# Patient Record
Sex: MALE | Race: WHITE | Employment: OTHER | URBAN - METROPOLITAN AREA
[De-identification: names, ages, dates, MRNs, and addresses within clinical notes are randomized per-mention and may not be internally consistent; named-entity substitution may affect disease eponyms.]

---

## 2020-05-21 ENCOUNTER — HISTORICAL (OUTPATIENT)
Dept: CARDIOLOGY | Facility: HOSPITAL | Age: 70
End: 2020-05-21

## 2020-06-04 ENCOUNTER — HISTORICAL (OUTPATIENT)
Dept: CARDIOLOGY | Facility: HOSPITAL | Age: 70
End: 2020-06-04

## 2022-02-17 LAB — HBA1C MFR BLD: 6.5 % (ref 4–6)

## 2022-04-30 NOTE — OP NOTE
DATE OF SURGERY:    05/21/2020    SURGEON:  Adan Contreras MD    PROCEDURES:    1. Bilateral lower extremity angiography.  2. Successful crossing of the 100% occluded right mid SFA, popliteal artery, TP trunk into the posterior tibial artery.  3. Laser atherectomy, percutaneous transluminal angioplasty and stenting of the right SFA and the popliteal artery.    ACCESS:  Left common femoral artery.    INDICATION:  Rest claudication, Hilger class 5 symptoms.    SEDATION TIME:  40 minutes.    PROCEDURE IN DETAIL:  The patient was brought to the cardiac catheterization laboratory.  After obtaining informed consent, the left groin was prepped and draped in the usual sterile manner, copious amount of 2% xylocaine was instilled to the left groin.  The left common femoral artery was accessed using a standard Seldinger technique.  A 5-Mauritanian sheath introduced.  Next, using a combination of 5-Mauritanian Omni Flush catheter and 0.035-inch stiff angled Glidewire, the catheter was advanced into the distal abdominal aorta and subsequently the catheter was diverted over the wire into the right common femoral artery.  Angiogram of the right lower extremity was performed which revealed patent right common femoral artery and profunda femoris artery.  The mid segment of the SFA was 100% occluded.  The popliteal artery was 100% occluded.  The TP trunk was 100% occluded and posterior tibial artery was faintly recanalized by the collaterals in the mid segment.     At this point of time, after the administration of adequate anticoagulation, the short 5-Mauritanian sheath in the left common femoral artery was exchanged for a long 6 x 45 cm sheath in a crossover fashion.  Next, using a combination of support catheter, namely an 0.035 Quick-Cross catheter and 0.35 stiff angled Glidewire, I was able to successfully traverse through the mid SFA into the popliteal artery.  Then, I changed the wire to 0.018 inch wire and I traversed the  TP trunk into the posterior tibial artery.  Confirmation angiogram was performed.  Next, the wire was exchanged for a 0.014-inch Mongo wire, and laser atherectomy of the SFA, popliteal and TP trunk was performed using a Cityvox 2 mm laser catheter followed by balloon angioplasty.  We then used a 6 x 200 length balloon for the SFA and the popliteal artery of the right lower extremity and a 3 x 2 mm length balloon with the TP trunk in the posterior tibial artery.  Angiogram was performed which revealed some flow-limiting dissections with the residual of more than 50% complex stenoses.  At this point in time, 2 self-expanding stents, namely 6 x 150 and a 6 x 100 mm stents, were deployed in the popliteal artery and the superficial artery of the right lower extremity.  These stents were post dilated using a 6 x 200 mm length balloon.     Final angiogram revealed a good angiographic result with less than 10% residual stenosis with a 1 vessel runoff to the foot, namely the posterior tibial artery.   Next, angiogram of the left lower extremity was performed which revealed moderate in-stent restenosis of the left superficial femoral artery and severe infrapopliteal vessel disease.     In view of the critical limb ischemia, I will bring the patient back in the next 2-3 weeks time to intervene in the proximal left lower extremity.  The patient tolerated the procedure well.  No complications were noted.        ______________________________  Adan Contreras MD    RRP/UL  DD:  05/24/2020  Time:  04:10PM  DT:  05/24/2020  Time:  04:56PM  Job #:  [id number]

## 2022-07-13 PROCEDURE — G0179 PR HOME HEALTH MD RECERTIFICATION: ICD-10-PCS | Mod: ,,, | Performed by: FAMILY MEDICINE

## 2022-07-13 PROCEDURE — G0179 MD RECERTIFICATION HHA PT: HCPCS | Mod: ,,, | Performed by: FAMILY MEDICINE

## 2022-08-01 ENCOUNTER — DOCUMENT SCAN (OUTPATIENT)
Dept: HOME HEALTH SERVICES | Facility: HOSPITAL | Age: 72
End: 2022-08-01

## 2022-08-18 ENCOUNTER — DOCUMENT SCAN (OUTPATIENT)
Dept: HOME HEALTH SERVICES | Facility: HOSPITAL | Age: 72
End: 2022-08-18

## 2022-08-25 ENCOUNTER — DOCUMENT SCAN (OUTPATIENT)
Dept: HOME HEALTH SERVICES | Facility: HOSPITAL | Age: 72
End: 2022-08-25

## 2022-08-29 ENCOUNTER — EXTERNAL HOME HEALTH (OUTPATIENT)
Dept: HOME HEALTH SERVICES | Facility: HOSPITAL | Age: 72
End: 2022-08-29

## 2022-09-07 ENCOUNTER — DOCUMENT SCAN (OUTPATIENT)
Dept: HOME HEALTH SERVICES | Facility: HOSPITAL | Age: 72
End: 2022-09-07

## 2022-10-12 ENCOUNTER — DOCUMENT SCAN (OUTPATIENT)
Dept: HOME HEALTH SERVICES | Facility: HOSPITAL | Age: 72
End: 2022-10-12
Payer: MEDICARE

## 2023-04-28 ENCOUNTER — OFFICE VISIT (OUTPATIENT)
Dept: FAMILY MEDICINE | Facility: CLINIC | Age: 73
End: 2023-04-28
Payer: MEDICARE

## 2023-04-28 VITALS
RESPIRATION RATE: 18 BRPM | BODY MASS INDEX: 27.89 KG/M2 | WEIGHT: 184 LBS | HEART RATE: 85 BPM | DIASTOLIC BLOOD PRESSURE: 67 MMHG | OXYGEN SATURATION: 97 % | HEIGHT: 68 IN | TEMPERATURE: 98 F | SYSTOLIC BLOOD PRESSURE: 134 MMHG

## 2023-04-28 DIAGNOSIS — E10.65 TYPE 1 DIABETES MELLITUS WITH HYPERGLYCEMIA: Primary | ICD-10-CM

## 2023-04-28 DIAGNOSIS — G60.0 CHARCOT MARIE TOOTH MUSCULAR ATROPHY: ICD-10-CM

## 2023-04-28 DIAGNOSIS — I25.10 CORONARY ARTERY DISEASE INVOLVING NATIVE CORONARY ARTERY, UNSPECIFIED WHETHER ANGINA PRESENT, UNSPECIFIED WHETHER NATIVE OR TRANSPLANTED HEART: ICD-10-CM

## 2023-04-28 DIAGNOSIS — E78.5 HYPERLIPIDEMIA, UNSPECIFIED HYPERLIPIDEMIA TYPE: ICD-10-CM

## 2023-04-28 DIAGNOSIS — G71.00 MUSCULAR DYSTROPHY: ICD-10-CM

## 2023-04-28 DIAGNOSIS — I77.9 PERIPHERAL ARTERIAL OCCLUSIVE DISEASE: ICD-10-CM

## 2023-04-28 PROCEDURE — 99204 OFFICE O/P NEW MOD 45 MIN: CPT | Mod: ,,, | Performed by: NURSE PRACTITIONER

## 2023-04-28 PROCEDURE — 99204 PR OFFICE/OUTPT VISIT, NEW, LEVL IV, 45-59 MIN: ICD-10-PCS | Mod: ,,, | Performed by: NURSE PRACTITIONER

## 2023-04-28 RX ORDER — CLOPIDOGREL BISULFATE 75 MG/1
75 TABLET ORAL DAILY
COMMUNITY
Start: 2023-03-20 | End: 2023-05-26 | Stop reason: SDUPTHER

## 2023-04-28 RX ORDER — CILOSTAZOL 50 MG/1
50 TABLET ORAL 2 TIMES DAILY
COMMUNITY
Start: 2023-04-11 | End: 2023-05-26 | Stop reason: SDUPTHER

## 2023-04-28 RX ORDER — ROSUVASTATIN CALCIUM 20 MG/1
20 TABLET, COATED ORAL NIGHTLY
COMMUNITY
Start: 2023-04-03 | End: 2023-05-26 | Stop reason: SDUPTHER

## 2023-04-28 RX ORDER — INSULIN GLARGINE 300 U/ML
18 INJECTION, SOLUTION SUBCUTANEOUS NIGHTLY
COMMUNITY
Start: 2022-12-28

## 2023-04-28 RX ORDER — INSULIN ASPART 100 [IU]/ML
INJECTION, SOLUTION INTRAVENOUS; SUBCUTANEOUS
COMMUNITY
Start: 2023-03-23

## 2023-04-28 NOTE — PROGRESS NOTES
Subjective:      Patient ID: Luciano Mariee is a 72 y.o. White male      Chief Complaint: Establish Care       Past Medical History:   Diagnosis Date    CAD (coronary artery disease)     Charcot Jagruti Tooth muscular atrophy     History of foot ulcer     History of MI (myocardial infarction) 2012    Hyperlipidemia     PAD (peripheral artery disease)     Type 1 diabetes     Unspecified cataract         HPI  Presents to clinic for establishment.  Has upcoming appt with Dr. Moore.    Type 1 DM:  Diagnosed at age 7.  States most recent A1C is 5.7 (2023)  Currently taking Toujeo 18 units hs and Novolog 16 units before breakfast , 10 units at noon; and 10 units dinner.  Old records requested; recent labs with Pallavi Endocrinologist.    DM eye exam due; states he set up appt; will let us know appt date next visit    Ade (DM foot exam) x 3 weeks ago; results requested    Cardiologist is Dr. Cholo REECE (PTA/stent); CABG/Valve Replacement; Hx MI  Currently on Plavix, Pletal  Old records requested    HLD on Statin.  Tolerating well.      Hx Charcot-Jagruti-Tooth muscular dystrophy.  Not currently seeing any specialist.  Has HH with Julianna once weekly        Review of Systems   Constitutional:  Negative for chills, fatigue, fever and unexpected weight change.   HENT: Negative.     Eyes: Negative.    Respiratory: Negative.  Negative for shortness of breath.    Cardiovascular: Negative.  Negative for chest pain.   Gastrointestinal: Negative.    Endocrine: Negative.    Genitourinary: Negative.    Musculoskeletal: Negative.    Integumentary:  Negative.   Allergic/Immunologic: Negative.    Neurological: Negative.  Negative for weakness.   Hematological: Negative.    Psychiatric/Behavioral: Negative.     All other systems reviewed and are negative.       Objective:      Vitals:    04/28/23 0900   BP: 134/67   Pulse: 85   Resp: 18   Temp: 97.6 °F (36.4 °C)      Body mass index is 27.98 kg/m².     Physical Exam  Vitals reviewed.    Constitutional:       Appearance: He is not toxic-appearing.   HENT:      Head: Normocephalic.      Mouth/Throat:      Mouth: Mucous membranes are moist.   Eyes:      Extraocular Movements: Extraocular movements intact.      Pupils: Pupils are equal, round, and reactive to light.   Cardiovascular:      Rate and Rhythm: Normal rate and regular rhythm.      Pulses: Normal pulses.      Heart sounds: Normal heart sounds.   Pulmonary:      Effort: Pulmonary effort is normal. No respiratory distress.      Breath sounds: Normal breath sounds.   Abdominal:      General: Bowel sounds are normal. There is no distension.      Palpations: Abdomen is soft.      Tenderness: There is no abdominal tenderness.   Musculoskeletal:         General: No tenderness. Normal range of motion.      Cervical back: Neck supple.   Skin:     General: Skin is warm and dry.   Neurological:      Mental Status: He is alert and oriented to person, place, and time.   Psychiatric:         Mood and Affect: Mood normal.          Current Outpatient Medications:     cilostazoL (PLETAL) 50 MG Tab, Take 50 mg by mouth 2 (two) times daily. 30 minutes before or 2 hours after breakfast and dinner, Disp: , Rfl:     clopidogreL (PLAVIX) 75 mg tablet, Take 75 mg by mouth once daily., Disp: , Rfl:     NOVOLOG FLEXPEN U-100 INSULIN 100 unit/mL (3 mL) InPn pen, Inject into the skin., Disp: , Rfl:     rosuvastatin (CRESTOR) 20 MG tablet, Take 20 mg by mouth every evening., Disp: , Rfl:     TOUJEO SOLOSTAR U-300 INSULIN 300 unit/mL (1.5 mL) InPn pen, Inject into the skin., Disp: , Rfl:     Assessment & Plan:     Problem List Items Addressed This Visit          Neuro    Charcot Jagruti Tooth muscular atrophy     Stable  Not currently seeing any specialist              Cardiac/Vascular    Peripheral arterial occlusive disease     Stable  Continue Statin  Keep appt with Cardiologist for follow up             CAD (coronary artery disease)     Stable  Continue current  meds  Keep appt with Cardiologist for follow up             Hyperlipidemia     Stable  Continue Statin as prescribed  Keep appt with PCP for follow up  Verbalizes understanding              Endocrine    Type 1 diabetes mellitus with hyperglycemia - Primary      Diagnosed at age 7.  States most recent A1C is 5.7 (2023)  Currently taking Toujeo 18 units hs and Novolog 16 units before breakfast, 10 units at noon; and 10 units dinner  Med management:  Continue insulin regimen as prescribed; verbalizes understanding  Daily CBG's  Educated on hypoglycemia and interventions  Old records requested; recent labs with Sary Endocrinologist.    DM eye exam due; states he set up appt; will let us know appt date next visit   Dr. Booker (DM foot exam) x 3 weeks ago; results requested             Relevant Medications    NOVOLOG FLEXPEN U-100 INSULIN 100 unit/mL (3 mL) InPn pen    TOUJEO SOLOSTAR U-300 INSULIN 300 unit/mL (1.5 mL) InPn pen       Orthopedic    Muscular dystrophy

## 2023-05-01 PROBLEM — G60.0 CHARCOT MARIE TOOTH MUSCULAR ATROPHY: Status: ACTIVE | Noted: 2023-05-01

## 2023-05-01 NOTE — ASSESSMENT & PLAN NOTE
Diagnosed at age 7.  States most recent A1C is 5.7 (2023)  Currently taking Toujeo 18 units hs and Novolog 16 units before breakfast, 10 units at noon; and 10 units dinner  Med management:  Continue insulin regimen as prescribed; verbalizes understanding  Daily CBG's  Educated on hypoglycemia and interventions  Old records requested; recent labs with Sary, Endocrinologist.    DM eye exam due; states he set up appt; will let us know appt date next visit   Dr. Booker (DM foot exam) x 3 weeks ago; results requested     Patient Education   Patient Education   Patient Education     Having EMG and NCS Tests  You will be having electromyography (EMG) and nerve conduction studies (NCS) to measure your muscle and nerve function. In most cases, both tests are done. NCS is most often done first. You will be asked to lie on an exam table with a blanket over you. You may have 1 or both of the following:    Nerve conduction study (NCS)  During NCS, mild electrical currents are used to test how fast electrical signals move along your nerves. The healthcare provider will put small metal disks (electrodes) on your skin on the area of your body being tested. This will be done using water-based gel or paste. A doctor or technologist will apply mild electrical currents to your skin. Your muscles will twitch, but the test won’t harm you. Currents are usually applied to the same area several times. Usually the intensity of the electrical stimulation is increased on each body part. There may be some increasing mild pain that varies from person to person. But the electrical shock is not dangerous. The test may continue on other parts of your body unless the reason for doing the test is limited to a small part of the body.  Electromyography (EMG)  Most of the electrodes will be removed for EMG. The healthcare provider will clean the area being tested with alcohol. A very thin sterile needle will be put into the muscles in this area. When the needle is inserted, you may feel as if your skin is being pinched. Try to relax and do as instructed. You will be asked to relax and contract the muscle being tested. Following instructions will allow your provider to interpret the test results.  Let the technologist know  For your safety and for the success of your test, tell the technologist if you:  · Have any bleeding problems  · Take blood thinners (anticoagulants) or other medicines, including aspirin  · Have any immune system problems  · Have had neck or  back surgery  You may also be asked questions about your overall health.  Before the test  Prepare for your test as instructed. Shower or bathe, but don't use powder, oil, or lotion. Your skin should be clean and free of excess oil. Wear loose clothes. But know that you may be asked to change into a hospital gown. The entire test will take about 60 minutes. Allow extra time to check in.  After your test  Before you leave, all electrodes will be removed. You can then get right back to your normal routine. If you feel tired or have some mild pain, take it easy. If you were told to stop taking any medicines for your test, ask when you can start taking them again. Your healthcare provider will let you know when your test results are ready.  Date Last Reviewed: 12/1/2017 © 2000-2018 "[employer]. [address] [place] [address]. All rights reserved. This information is not intended as a substitute for professional medical care. Always follow your healthcare professional's instructions.           Carpal Tunnel Release Surgery  Surgery may be done if your carpal tunnel syndrome (CTS) symptoms become severe. Or you may have surgery if no other treatment eases your symptoms. There are 2 types of CTS surgical procedures. You will be told about the one you will have. You’ll also be instructed on how to prepare for it.      The goals of surgery  Two types of surgery are used to treat CTS: open and endoscopic.  · Open surgery. With open surgery, your surgeon makes 1 cut (incision) in your palm. Standard surgical tools are used.  · Endoscopic surgery. For this surgery, 1 or 2 small incisions are made in your hand or wrist. A thin tube with a very small camera attached (endoscope) is inserted under the carpal ligament. Tiny tools are inserted there as well. The surgeon then operates while watching images on a video screen. Each procedure has the same goal: Your surgeon will relieve pressure on the median  nerve. To do this, the transverse carpal ligament is cut (released).  After surgery  If you’ve had carpal tunnel surgery, you will spend a few hours resting before you go home. The nerve sensation and blood circulation in your hand will be checked at this time. For the safest healing, do the following:  · Keep your hand raised above heart level. This will help reduce swelling.  · Limit hand and wrist use as instructed. You may need a wrist brace.  · Take any pain medicine as directed.  · Do hand exercises as directed by your surgeon or therapist.  When to call the surgeon  Call your surgeon if you notice any of the following:  · White or pale-blue hand or nails (you pinch your skin or nail and the color doesn’t return)  · Pain that is not relieved by prescribed medicine  · Loss of sensation or excess swelling in hand or fingers  · Pus-like liquid oozing out of your wound  · Fever over 100.4°F (38°C)   Date Last Reviewed: 1/1/2018 © 2000-2018 [employer]. [address] [place] [address]. All rights reserved. This information is not intended as a substitute for professional medical care. Always follow your healthcare professional's instructions.           Understanding Carpal Tunnel Syndrome    The carpal tunnel is a narrow space inside the wrist. It is ringed by bone and a band of tough tissue called the transverse carpal ligament. A major nerve called the median nerve runs from the forearm into the hand through the carpal tunnel. Tendons also run through the carpal tunnel.  With carpal tunnel syndrome, the tendons or nearby tissues within the carpal tunnel may swell or thicken. Or the transverse carpal ligament may harden and shorten. This narrows the space in the carpal tunnel and puts pressure on the median nerve. This pressure leads to tingling and numbness of the hand and wrist. In time, the condition can make even simple tasks hard to do.  What causes carpal tunnel syndrome?  Doctors  aren’t entirely clear why the condition occurs. Certain things may make a person more likely to have it. These include:  · Being female  · Being pregnant  · Being overweight  · Having diabetes or rheumatoid arthritis  Symptoms of carpal tunnel syndrome  Symptoms often come and go. At first, symptoms may occur mainly at night. Later, they may be noticed during the day as well. They may get worse with activities such as driving, reading, typing, or holding a phone. Symptoms can include:  · Tingling and numbness in the hand or wrist  · Sharp pain that shoots up the arm or down to the fingers  · Hand stiffness or cramping, especially in the morning  · Trouble making a fist  · Hand weakness and clumsiness  Treatment for carpal tunnel syndrome  Certain treatments help reduce the pressure on the median nerve and relieve symptoms. Choices for treatment may include one or more of the following:  · Wrist splint. This involves wearing a special brace on the wrist and hand. The splint holds the wrist straight, in a neutral position. This helps keep the carpal tunnel as open as possible.  · Cortisone shots. Cortisone is a medicine that helps reduce swelling. It is injected directly into the wrist. It helps shrink tissues inside the carpal tunnel. This relieves symptoms for a time.  · Pain medicines. You may take over-the-counter or prescription medicines to help reduce swelling and relieve symptoms.  · Surgery. If the condition doesn’t respond to other treatments and doesn’t go away on its own, you may need surgery. During surgery, the surgeon cuts the transverse carpal ligament to relieve pressure on the median nerve.     When to call your healthcare provider  Call your healthcare provider right away if you have any of these:  · Fever of 100.4°F (38°C) or higher, or as directed  · Symptoms that don’t get better, or get worse  · New symptoms   Date Last Reviewed: 3/10/2016  © 5401-8124 [employer]. [address] [place] [address]  [place] [address]. All rights reserved. This information is not intended as a substitute for professional medical care. Always follow your healthcare professional's instructions.

## 2023-05-19 ENCOUNTER — DOCUMENTATION ONLY (OUTPATIENT)
Dept: FAMILY MEDICINE | Facility: CLINIC | Age: 73
End: 2023-05-19
Payer: MEDICARE

## 2023-05-26 ENCOUNTER — CLINICAL SUPPORT (OUTPATIENT)
Dept: FAMILY MEDICINE | Facility: CLINIC | Age: 73
End: 2023-05-26
Attending: INTERNAL MEDICINE
Payer: MEDICARE

## 2023-05-26 ENCOUNTER — OFFICE VISIT (OUTPATIENT)
Dept: FAMILY MEDICINE | Facility: CLINIC | Age: 73
End: 2023-05-26
Payer: MEDICARE

## 2023-05-26 VITALS
BODY MASS INDEX: 28.1 KG/M2 | TEMPERATURE: 98 F | SYSTOLIC BLOOD PRESSURE: 120 MMHG | WEIGHT: 185.38 LBS | DIASTOLIC BLOOD PRESSURE: 68 MMHG | HEIGHT: 68 IN | OXYGEN SATURATION: 98 % | RESPIRATION RATE: 16 BRPM | HEART RATE: 70 BPM

## 2023-05-26 DIAGNOSIS — E10.65 TYPE 1 DIABETES MELLITUS WITH HYPERGLYCEMIA: ICD-10-CM

## 2023-05-26 DIAGNOSIS — I25.10 CORONARY ARTERY DISEASE INVOLVING NATIVE CORONARY ARTERY, UNSPECIFIED WHETHER ANGINA PRESENT, UNSPECIFIED WHETHER NATIVE OR TRANSPLANTED HEART: ICD-10-CM

## 2023-05-26 DIAGNOSIS — E78.5 HYPERLIPIDEMIA, UNSPECIFIED HYPERLIPIDEMIA TYPE: ICD-10-CM

## 2023-05-26 DIAGNOSIS — H35.363 DRUSEN OF MACULA OF BOTH EYES: Primary | ICD-10-CM

## 2023-05-26 DIAGNOSIS — L97.909 CHRONIC SKIN ULCER OF LOWER LEG: ICD-10-CM

## 2023-05-26 DIAGNOSIS — H47.20 OPTIC NERVE ATROPHY, RIGHT: ICD-10-CM

## 2023-05-26 DIAGNOSIS — Z23 NEED FOR VACCINATION: ICD-10-CM

## 2023-05-26 DIAGNOSIS — E10.42 DIABETIC POLYNEUROPATHY ASSOCIATED WITH TYPE 1 DIABETES MELLITUS: ICD-10-CM

## 2023-05-26 DIAGNOSIS — Z00.00 WELLNESS EXAMINATION: Primary | ICD-10-CM

## 2023-05-26 DIAGNOSIS — I77.9 PERIPHERAL ARTERIAL OCCLUSIVE DISEASE: ICD-10-CM

## 2023-05-26 PROCEDURE — 92228 IMG RTA DETC/MNTR DS PHY/QHP: CPT | Mod: TC,,, | Performed by: INTERNAL MEDICINE

## 2023-05-26 PROCEDURE — G0009 ADMIN PNEUMOCOCCAL VACCINE: HCPCS | Mod: ,,, | Performed by: INTERNAL MEDICINE

## 2023-05-26 PROCEDURE — 92228 PR REMOTE IMAGE RETINA, MONITOR/MANAGE ACTIVE DISEASE: ICD-10-PCS | Mod: TC,,, | Performed by: INTERNAL MEDICINE

## 2023-05-26 PROCEDURE — G0009 PNEUMOCOCCAL CONJUGATE VACCINE 20-VALENT: ICD-10-PCS | Mod: ,,, | Performed by: INTERNAL MEDICINE

## 2023-05-26 PROCEDURE — 90677 PNEUMOCOCCAL CONJUGATE VACCINE 20-VALENT: ICD-10-PCS | Mod: ,,, | Performed by: INTERNAL MEDICINE

## 2023-05-26 PROCEDURE — 92228 IMG RTA DETC/MNTR DS PHY/QHP: CPT | Mod: 26,,, | Performed by: OPHTHALMOLOGY

## 2023-05-26 PROCEDURE — 90677 PCV20 VACCINE IM: CPT | Mod: ,,, | Performed by: INTERNAL MEDICINE

## 2023-05-26 PROCEDURE — 92228 DIABETIC EYE SCREENING PHOTO: ICD-10-PCS | Mod: 26,,, | Performed by: OPHTHALMOLOGY

## 2023-05-26 PROCEDURE — G0439 PPPS, SUBSEQ VISIT: HCPCS | Mod: ,,, | Performed by: INTERNAL MEDICINE

## 2023-05-26 PROCEDURE — G0439 PR MEDICARE ANNUAL WELLNESS SUBSEQUENT VISIT: ICD-10-PCS | Mod: ,,, | Performed by: INTERNAL MEDICINE

## 2023-05-26 RX ORDER — ASPIRIN 81 MG/1
81 TABLET ORAL DAILY
COMMUNITY

## 2023-05-26 RX ORDER — CLOPIDOGREL BISULFATE 75 MG/1
75 TABLET ORAL DAILY
Qty: 90 TABLET | Refills: 3 | Status: SHIPPED | OUTPATIENT
Start: 2023-05-26

## 2023-05-26 RX ORDER — ROSUVASTATIN CALCIUM 20 MG/1
20 TABLET, COATED ORAL NIGHTLY
Qty: 90 TABLET | Refills: 3 | Status: SHIPPED | OUTPATIENT
Start: 2023-05-26

## 2023-05-26 RX ORDER — CILOSTAZOL 50 MG/1
50 TABLET ORAL 2 TIMES DAILY
Qty: 180 TABLET | Refills: 3 | Status: SHIPPED | OUTPATIENT
Start: 2023-05-26

## 2023-05-26 NOTE — PROGRESS NOTES
Luciano Mariee is a 72 y.o. male here for a diabetic eye screening with non-dilated fundus photos per Dr Berny Moore.    Patient cooperative?: Yes  Small pupils?: Yes  Last eye exam: N/A    For exam results, see Encounter Report.

## 2023-05-26 NOTE — PROGRESS NOTES
Patient ID: [id number]     Chief Complaint: No chief complaint on file.      HPI:     Luciano Mariee is a 72 y.o. male here today for a Medicare Wellness.     New Patient, Establish Care and MEDICARE Wellness Visit  Has seen our NP Adelia 04/2023    Patient is , has two adult children that he does not have good relationship with. He owns a mobile home on his own land. Patient cannot drive, has a friend/neighbor Irving who helps pick him up/gets groceries/errands etc. Patient cooks/cleans/takes his medication on his own. Declines assistance with medical transportation.       Type 1 DM:  Diagnosed at age 7  Currently taking Toujeo 18 units qhs and Novolog 20 units before breakfast , 10 units at noon; and 10 units dinner.  Las A1c 6.6  Complications including neuropathy, no retinopathy, failed to do urine microalbumin with endocrine previously  recent labs with Marcela Endocrinologist.    DM eye exam due vision is worse, in office today but needs referral which was placed today      Chronic Ulcer of L foot:  Dr. Saldana following; doing well today this has healed     Cardiologist is Dr. Cholo REECE (PTA/stent); CABG/Valve Replacement; Hx MI  Currently on Plavix, Pletal and aspirin 81 mg po daily  Old records requested but not received      HLD on Statin.  Tolerating well.       Hx Charcot-Jagruti-Tooth muscular dystrophy.  Not currently seeing any specialist.       Opioid Screening: Patient medication list reviewed, patient is not taking prescription opioids. Patient is not using additional opioids than prescribed. Patient is at low risk of substance abuse based on this opioid use history.       ----------------------------  CAD (coronary artery disease)  Charcot Jagruti Tooth muscular atrophy  History of foot ulcer  History of MI (myocardial infarction)  Hyperlipidemia  Muscular dystrophy  PAD (peripheral artery disease)  Type 1 diabetes  Unspecified cataract     Past Surgical History:   Procedure Laterality Date     CATARACT EXTRACTION Right     CORONARY ARTERY BYPASS GRAFT      Heart Valve Placement      PTA with stents      VARICOSE VEIN SURGERY         Review of patient's allergies indicates:  No Known Allergies    Outpatient Medications Marked as Taking for the 23 encounter (Office Visit) with Berny Moore MD   Medication Sig Dispense Refill    aspirin (ECOTRIN) 81 MG EC tablet Take 81 mg by mouth once daily.      NOVOLOG FLEXPEN U-100 INSULIN 100 unit/mL (3 mL) InPn pen Inject into the skin. 16 units before breakfast 10 units at noon 10 units with dinner      TOUJEO SOLOSTAR U-300 INSULIN 300 unit/mL (1.5 mL) InPn pen Inject 18 Units into the skin every evening.      [DISCONTINUED] cilostazoL (PLETAL) 50 MG Tab Take 50 mg by mouth 2 (two) times daily. 30 minutes before or 2 hours after breakfast and dinner      [DISCONTINUED] clopidogreL (PLAVIX) 75 mg tablet Take 75 mg by mouth once daily.      [DISCONTINUED] rosuvastatin (CRESTOR) 20 MG tablet Take 20 mg by mouth every evening.         Social History     Socioeconomic History    Marital status: Single   Tobacco Use    Smoking status: Former     Packs/day: 1.00     Years: 30.00     Pack years: 30.00     Types: Cigarettes     Quit date:      Years since quittin.4    Smokeless tobacco: Never   Substance and Sexual Activity    Alcohol use: Never    Drug use: Never        History reviewed. No pertinent family history.     Patient Care Team:  Berny Moore MD as PCP - General (Internal Medicine)       Subjective:     Review of Systems   Constitutional:  Negative for fever.   Eyes:  Negative for pain.   Cardiovascular:  Negative for chest pain.   Gastrointestinal:  Negative for abdominal pain.       Patient Reported Health Risk Assessment  What [age] 70-79  Are you male or female?: Male  During the past four weeks, how much have you been bothered by emotional problems such as feeling anxious, depressed, irritable, sad, or downhearted and blue?: Not at  all  During the past five weeks, has your physical and/or emotional health limited your social activities with family, friends, neighbors, or groups?: Not at all  During the past four weeks, how much bodily pain have you generally had?: Mild pain  During the past four weeks, was someone available to help if you needed and wanted help?: Yes, as much as I wanted  During the past four weeks, what was the hardest physical activity you could do for at least two minutes?: Very light  Can you get to places out of walking distance without help?  (For example, can you travel alone on buses or taxis, or drive your own car?): No  Can you go shopping for groceries or clothes without someone's help?: No  Can you prepare your own meals?: Yes  Can you do your own housework without help?: Yes  Because of any health problems, do you need the help of another person with your personal care needs such as eating, bathing, dressing, or getting around the house?: Yes  Can you handle your own money without help?: Yes  During the past four weeks, how would you rate your health in general?: Good  How have things been going for you during the past four weeks?: Good and bad parts about equal  Are you having difficulties driving your car?: Not applicable, I do not use a car  Do you always fasten your seat belt when you are in a car?: Yes, usually  How often in the past four weeks have you been bothered by falling or dizzy when standing up?: Seldom  How often in the past four weeks have you been bothered by sexual problems?: Never  How often in the past four weeks have you been bothered by trouble eating well?: Never  How often in the past four weeks have you been bothered by teeth or denture problems?: Never  How often in the past four weeks have you been bothered with problems using the telephone?: Never  How often in the past four weeks have you been bothered by tiredness or fatigue?: Seldom  Have you fallen two or more times in the past  "year?: No  Are you afraid of falling?: No  Are you a smoker?: No  During the past four weeks, how many drinks of wine, beer, or other alcoholic beverages did you have?: No alcohol at all  Do you exercise for about 20 minutes three or more days a week?: No, I usually do not exercise this much  Have you been given any information to help you with hazards in your house that might hurt you?: Yes  Have you been given any information to help you with keeping track of your medications?: Yes  How often do you have trouble taking medicines the way you've been told to take them?: I always take them as prescribed  How confident are you that you can control and manage most of your health problems?: Somewhat confident  What is your race? (Check all that apply.):     Objective:     /68 (BP Location: Left arm, Patient Position: Sitting, BP Method: Large (Automatic))   Pulse 70   Temp 98.4 °F (36.9 °C) (Temporal)   Resp 16   Ht 5' 8" (1.727 m)   Wt 84.1 kg (185 lb 6.4 oz)   SpO2 98%   BMI 28.19 kg/m²     Physical Exam  Vitals and nursing note reviewed.   Constitutional:       Appearance: Normal appearance.      Comments: Patient sitting in wheel chair  His hands are turned out at wrist and dip joints bilaterally     Neurological:      Mental Status: He is alert.   Psychiatric:         Behavior: Behavior normal.     Protective Sensation (w/ 10 gram monofilament):  Right: Decreased  Left: Absent    Visual Inspection:  Dry Skin -  Bilateral  Swelling of feet bilaterally  Feet are turned out at the ankle joint bilaterally    Pedal Pulses:   Right: Diminished  Left: Diminished    Posterior Tibialis Pulses:   Right:Diminished  Left: Diminished      No flowsheet data found.  Fall Risk Assessment - Outpatient 5/26/2023 4/28/2023   Mobility Status Ambulatory w/ assistance Wheelchair Bound   Number of falls 0 0   Identified as fall risk 1 1           Depression Screening  Over the past two weeks, has the patient felt " down, depressed, or hopeless?: No  Over the past two weeks, has the patient felt little interest or pleasure in doing things?: No  Functional Ability/Safety Screening  Was the patient's timed Up & Go test unsteady or longer than 30 seconds?: Yes  Does the patient need help with phone, transportation, shopping, preparing meals, housework, laundry, meds, or managing money?: Yes  Does the patient's home have rugs in the hallway, lack grab bars in the bathroom, lack handrails on the stairs or have poor lighting?: No  Have you noticed any hearing difficulties?: No  Cognitive Function (Assessed through direct observation with due consideration of information obtained by way of patient reports and/or concerns raised by family, friends, caretakers, or others)    Does the patient repeat questions/statements in the same day?: No  Does the patient have trouble remembering the date, year, and time?: No  Does the patient have difficulty managing finances?: No  Does the patient have a decreased sense of direction?: No  Assessment/Plan:     1. Wellness examination  I did review endocrine outside labs  Will likely need additional work up atnext OV  2. Chronic skin ulcer of lower leg  Resolved, healed   3. Diabetic polyneuropathy associated with type 1 diabetes mellitus  Unchanged, continue insulin, DM protection/foot wear advised; f/u with podiatry  4. Type 1 diabetes mellitus with hyperglycemia  -     Diabetic Eye Screening Photo; Future  -     Ambulatory referral/consult to Ophthalmology; Future; Expected date: 06/02/2023  Unchanged, continue insulin per endocrine recs, low fat ADA diet advised, foot exam and eye exam in office  Needs to see ophthalmology- referred  5. Hyperlipidemia, unspecified hyperlipidemia type  Stable, continue statin  6. Peripheral arterial occlusive disease  -     Ambulatory referral/consult to Cardiology; Future; Expected date: 06/02/2023  Unchanged, patient is taking 3 blood thinners  I would like a  second opinion from  on patient management of his PAD And his CAD as well  Referral placed today    7. Coronary artery disease involving native coronary artery, unspecified whether angina present, unspecified whether native or transplanted heart  -     Ambulatory referral/consult to Cardiology; Future; Expected date: 06/02/2023  Unchanged compliant with statin  Recommend 2nd opinion on medication management from Dr. Rojas with [facility]  8. Need for vaccination  -     (In Office Administered) Pneumococcal Conjugate Vaccine (20 Valent) (IM)    Other orders  -     cilostazoL (PLETAL) 50 MG Tab; Take 1 tablet (50 mg total) by mouth 2 (two) times daily. 30 minutes before or 2 hours after breakfast and dinner  Dispense: 180 tablet; Refill: 3  -     rosuvastatin (CRESTOR) 20 MG tablet; Take 1 tablet (20 mg total) by mouth every evening.  Dispense: 90 tablet; Refill: 3  -     clopidogreL (PLAVIX) 75 mg tablet; Take 1 tablet (75 mg total) by mouth once daily.  Dispense: 90 tablet; Refill: 3           Medicare Annual Wellness and Personalized Prevention Plan:   Fall Risk + Home Safety + Hearing Impairment + Depression Screen + Opioid and Substance Abuse Screening + Cognitive Impairment Screen + Health Risk Assessment all reviewed.     Health Maintenance Topics with due status: Not Due       Topic Last Completion Date    High Dose Statin 05/26/2023    Aspirin/Antiplatelet Therapy 05/26/2023    Eye Exam 05/26/2023      The patient's Health Maintenance was reviewed and the following appears to be due at this time:   Health Maintenance Due   Topic Date Due    Hepatitis C Screening  Never done    Lipid Panel  Never done    Diabetes Urine Screening  Never done    Foot Exam  Never done    TETANUS VACCINE  Never done    Colorectal Cancer Screening  Never done    LDCT Lung Screen  Never done    Shingles Vaccine (1 of 2) Never done    Abdominal Aortic Aneurysm Screening  Never done    COVID-19 Vaccine (4 - Pfizer series) 02/22/2022     Hemoglobin A1c  08/17/2022       Advance Care Planning   Patient not interested in ACP Discussion at this time         Follow up in about 6 months (around 11/26/2023) for Follow Up - Chronic Conditions AND 1 year wellness . In addition to their scheduled follow up, the patient has also been instructed to follow up on as needed basis.

## 2023-05-30 PROBLEM — H47.20 OPTIC NERVE ATROPHY, RIGHT: Status: ACTIVE | Noted: 2023-05-30

## 2023-05-30 PROBLEM — H35.363 DRUSEN OF MACULA OF BOTH EYES: Status: ACTIVE | Noted: 2023-05-30

## 2023-08-31 LAB
LEFT EYE DM RETINOPATHY: POSITIVE
RIGHT EYE DM RETINOPATHY: POSITIVE

## 2023-11-21 LAB — HBA1C MFR BLD: 7.5 % (ref 4–6)

## 2024-02-07 ENCOUNTER — DOCUMENTATION ONLY (OUTPATIENT)
Dept: ADMINISTRATIVE | Facility: HOSPITAL | Age: 74
End: 2024-02-07
Payer: MEDICARE

## 2024-02-14 ENCOUNTER — PATIENT OUTREACH (OUTPATIENT)
Dept: ADMINISTRATIVE | Facility: HOSPITAL | Age: 74
End: 2024-02-14
Payer: MEDICARE

## 2024-02-14 NOTE — PROGRESS NOTES
Population Health. Out Reach. The following record(s)  below were uploaded for Health Maintenance .    DM EYE EXAM   8/31/23

## 2024-03-04 LAB — HBA1C MFR BLD: 7.2 % (ref 4–6)

## 2024-03-05 LAB
CHOLEST SERPL-MSCNC: 140 MG/DL (ref 0–200)
EGFR: 73
HDLC SERPL-MCNC: 73 MG/DL (ref 35–70)
LDLC SERPL CALC-MCNC: 60 MG/DL
TRIGL SERPL-MCNC: 66 MG/DL (ref 40–160)

## 2024-03-27 ENCOUNTER — PATIENT OUTREACH (OUTPATIENT)
Dept: ADMINISTRATIVE | Facility: HOSPITAL | Age: 74
End: 2024-03-27
Payer: MEDICARE

## 2024-03-27 NOTE — PROGRESS NOTES
Health Maintenance Topic(s) Outreach Outcomes & Actions Taken:    Blood Pressure - Outreach Outcomes & Actions Taken  : Remote Blood Pressure Reading Captured and BP uploaded from care everywhere cardiology visit at [facility]. 3/5/2024.    Colorectal Cancer Screening - Outreach Outcomes & Actions Taken  : No answer    Lab(s) - Outreach Outcomes & Actions Taken  : Overdue Lab(s) Scheduled and No answer    Primary Care Appt - Outreach Outcomes & Actions Taken  : Message sent to clinic to contact for appointment.     Low Dose CT Screening - Outreach Outcomes & Actions Taken  : No answer.    AAA Screening - Outreach Outcomes & Actions Taken  : No answer.     Additional Notes:  Last visit and AWV 5/26/2023. No follow up scheduled. Message sent to clinic to contact for appt.   A1c and lipid panel uploaded from care everywhere- [facility] visit 3/5/2024.  eGFR uploaded from [facility] everywhere - [facility] 3/4/2024  Colorectal cancer screening due  LDCT due  AAA screening due  SDOH due  Attempt made to contact patient. No answer.              Care Management, Digital Medicine, and/or Education Referrals      Next Steps - Referral Actions: Digital Medicine Outcomes and Actions Taken: Pt Declined or Not Eligible

## 2024-07-24 LAB — HBA1C MFR BLD: 7.7 %

## 2024-08-07 ENCOUNTER — PATIENT OUTREACH (OUTPATIENT)
Facility: CLINIC | Age: 74
End: 2024-08-07
Payer: MEDICARE

## 2024-08-13 ENCOUNTER — TELEPHONE (OUTPATIENT)
Dept: FAMILY MEDICINE | Facility: CLINIC | Age: 74
End: 2024-08-13
Payer: MEDICARE

## 2024-08-13 NOTE — TELEPHONE ENCOUNTER
----- Message from Silvana Beal LPN sent at 8/7/2024  1:00 PM CDT -----  Last visit > 1 year, 5/26/2023. Please contact for appt. AWV also due.   Thanks for all you do!!

## 2024-09-26 ENCOUNTER — PATIENT OUTREACH (OUTPATIENT)
Facility: CLINIC | Age: 74
End: 2024-09-26
Payer: MEDICARE

## 2024-10-15 ENCOUNTER — TELEPHONE (OUTPATIENT)
Dept: FAMILY MEDICINE | Facility: CLINIC | Age: 74
End: 2024-10-15
Payer: MEDICARE

## 2024-10-15 NOTE — TELEPHONE ENCOUNTER
----- Message from Galen sent at 10/15/2024 10:37 AM CDT -----  .Type:  Needs Medical Advice    Who Called: Luciano  Symptoms (please be specific):    How long has patient had these symptoms:    Pharmacy name and phone #:    Would the patient rather a call back or a response via [facility]?   Best Call Back Number: [phone]  Additional Information: Patient requested a call back from the nurse re: he is having uncontrolled bowel movements, please call him back when available. Thanks.

## 2024-10-15 NOTE — TELEPHONE ENCOUNTER
"Spoke with pt  Pt stated that he has been having this issue for a while now with his stomach and "diarrhea"   Pt stated that he "messed his pants" a few times and wants to know what he can do  I did advise pt that it would be best if he came in to be evaluated to determine the cause of this  Pt amendable to appt  Pt placed on schedule with NP tomorrow 10.16.2024 @ 1000 A  Pt expressed understanding to all of the above  Thanks   "

## 2024-10-16 ENCOUNTER — OFFICE VISIT (OUTPATIENT)
Dept: FAMILY MEDICINE | Facility: CLINIC | Age: 74
End: 2024-10-16
Payer: MEDICARE

## 2024-10-16 VITALS
WEIGHT: 154 LBS | HEIGHT: 68 IN | DIASTOLIC BLOOD PRESSURE: 64 MMHG | SYSTOLIC BLOOD PRESSURE: 135 MMHG | RESPIRATION RATE: 18 BRPM | HEART RATE: 72 BPM | TEMPERATURE: 97 F | BODY MASS INDEX: 23.34 KG/M2 | OXYGEN SATURATION: 99 %

## 2024-10-16 DIAGNOSIS — R19.7 DIARRHEA, UNSPECIFIED TYPE: Primary | ICD-10-CM

## 2024-10-16 DIAGNOSIS — R15.9 INCONTINENCE OF FECES, UNSPECIFIED FECAL INCONTINENCE TYPE: ICD-10-CM

## 2024-10-16 PROCEDURE — 99214 OFFICE O/P EST MOD 30 MIN: CPT | Mod: ,,, | Performed by: NURSE PRACTITIONER

## 2024-10-16 RX ORDER — LISINOPRIL 10 MG/1
10 TABLET ORAL DAILY
COMMUNITY

## 2024-10-16 NOTE — PROGRESS NOTES
Subjective:      Patient ID: Luciano Mariee is a 74 y.o. 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 Episode onset: 1 year ago. Pertinent negatives include no abdominal pain, chills, fever or vomiting. Associated symptoms comments: Bowel incontinence  .   States stools are not watery, just loose.  Symptoms occur intermittently.  States normal BM a few days ago.     Attempted to ask additional questions regarding symptoms; however, but patient expresses frustration due to inability to answer questions.  I reassured patient that it is okay to say he is unsure when he is unable to answer questions.    Patient Care Team:  Berny Moore MD as PCP - General (Internal Medicine)  Silvana Beal LPN as Care Coordinator  Blue Padron Jr., MD as Resident (Cardiology)  Riccardo Garcia MD as Consulting Physician (Endocrinology)      Review of Systems   Constitutional:  Negative for chills, fatigue, fever and unexpected weight change.   HENT: Negative.     Eyes: Negative.    Respiratory: Negative.  Negative for shortness of breath.    Cardiovascular: Negative.  Negative for chest pain.   Gastrointestinal:  Positive for diarrhea. Negative for abdominal pain, blood in stool, change in bowel habit, constipation, nausea, rectal pain, vomiting and fecal incontinence.   Endocrine: Negative.    Genitourinary: Negative.    Musculoskeletal: Negative.    Integumentary:  Negative.   Allergic/Immunologic:  Negative.    Neurological: Negative.  Negative for weakness.   Hematological: Negative.    Psychiatric/Behavioral: Negative.     All other systems reviewed and are negative.          Objective:      Vitals:    10/16/24 1006   BP: 135/64   Pulse:    Resp:    Temp:       Body mass index is 23.42 kg/m².     Physical Exam  Vitals reviewed.   Constitutional:       Appearance: He is not toxic-appearing.   HENT:      Head: Normocephalic.      Mouth/Throat:      Mouth: Mucous membranes are moist.   Eyes:      Extraocular Movements: Extraocular movements intact.      Pupils: Pupils are equal, round, and reactive to light.   Cardiovascular:      Rate and Rhythm: Normal rate and regular rhythm.      Pulses: Normal pulses.      Heart sounds: Normal heart sounds.   Pulmonary:      Effort: Pulmonary effort is normal. No respiratory distress.      Breath sounds: Normal breath sounds.   Abdominal:      General: Bowel sounds are normal. There is no distension.      Palpations: Abdomen is soft.      Tenderness: There is no abdominal tenderness.   Musculoskeletal:         General: No tenderness. Normal range of motion.      Cervical back: Neck supple.   Skin:     General: Skin is warm and dry.   Neurological:      Mental Status: He is alert and oriented to person, place, and time.   Psychiatric:         Mood and Affect: Mood normal.            Current Outpatient Medications:     aspirin (ECOTRIN) 81 MG EC tablet, Take 81 mg by mouth once daily., Disp: , Rfl:     cilostazoL (PLETAL) 50 MG Tab, Take 1 tablet (50 mg total) by mouth 2 (two) times daily. 30 minutes before or 2 hours after breakfast and dinner, Disp: 180 tablet, Rfl: 3    clopidogreL (PLAVIX) 75 mg tablet, Take 1 tablet (75 mg total) by mouth once daily., Disp: 90 tablet, Rfl: 3    lisinopriL 10 MG tablet, Take 10 mg by mouth once daily., Disp: , Rfl:     NOVOLOG FLEXPEN U-100 INSULIN 100 unit/mL (3 mL) InPn pen, Inject into the skin. 16 units before breakfast 10 units at  noon 10 units with dinner, Disp: , Rfl:     rosuvastatin (CRESTOR) 20 MG tablet, Take 1 tablet (20 mg total) by mouth every evening., Disp: 90 tablet, Rfl: 3    TOUJEO SOLOSTAR U-300 INSULIN 300 unit/mL (1.5 mL) InPn pen, Inject 18 Units into the skin every evening., Disp: , Rfl:     Assessment & Plan:     Problem List Items Addressed This Visit          GI    Diarrhea - Primary     Loose stools, with bowel incontinence at times  Declines stool testing (unable to collect stools due to hand deformities)  Pt agrees to GI referral; Referral placed  Keep appt with PCP for follow up           Relevant Orders    Ambulatory referral/consult to Gastroenterology    Incontinence of feces     Loose stools, with bowel incontinence at times  Declines stool testing (unable to collect stools due to hand deformities)  Pt agrees to GI referral; Referral placed  Keep appt with PCP for follow up         Relevant Orders    Ambulatory referral/consult to Gastroenterology          Prior to the patient's arrival on the same day, I spent (5) minutes reviewing chart. Once in the exam room with the patient, I spent (19) minutes in the room with the member performing a history and exam as well as reviewing the test results and recommendations with the patient. After leaving the exam room, I spent an additional (6) minutes completing the electronic health record. The total time spent that day caring for the member is (30) minutes, and this time - including the breakdown - is documented in the medical record.

## 2024-10-16 NOTE — ASSESSMENT & PLAN NOTE
Loose stools, with bowel incontinence at times  Declines stool testing (unable to collect stools due to hand deformities)  Pt agrees to GI referral; Referral placed  Keep appt with PCP for follow up

## 2025-02-07 ENCOUNTER — PATIENT OUTREACH (OUTPATIENT)
Facility: CLINIC | Age: 75
End: 2025-02-07
Payer: MEDICARE

## 2025-02-07 NOTE — PROGRESS NOTES
Attempt made to contact patient. No answer. Message left with name and phone number for callback.   Annual Wellness Exam: Due     Next PCP F/U: none scheduled. 1/31/2025 cancelled. Clinic also unsuccessful in contacting to r/s. Letter mailed.  Health Maintenance Topics Overdue: Letter mailed  Colon Cancer Screening  Urine Screening  Foot Exam  AAA Screening  A1c- record request to Dr Riccardo Garcia    DM: Novolog PDC 94%. Toujeo PDC 87%   Annual Kidney Eval: uACR & eGFR due    Statin Therapy for prevention of CVD: Crestor PDC 82%

## 2025-02-07 NOTE — LETTER
Dear Luciano    Your health and well being are very important to us. Our records indicate that your last appointment was on 10/16/2024. You had an appointment on 1/31/2025 that was cancelled. Please contact the clinic at [phone] or myself at [phone] to reschedule your appointment.     Thank you for choosing [facility] & have a great day!     LINDA Pina Care Coordinator  Berny Moore MD and your Primary Health Care Team

## 2025-02-07 NOTE — LETTER
Dear Ronnie [facility] is committed to your overall health. We have checked the health information in your chart to make sure you are up to date.    You may be due for     Colon Cancer Screening  Urine Screening  Eye Exam  Hemoglobin A1c  Foot Exam  AAA Screening  LDCT Lung Screen    If you have done any of these somewhere else, please let us know so we can update your chart.     If you would like, I can help get these items ordered for you (if needed) and also see if there is anything else we can do to help you. Please reply to this message in your patient portal or give me a call at [phone].          LINDA Pina Care Coordinator  Berny Moore MD and your [facility] Primary Care Team

## 2025-05-09 ENCOUNTER — PATIENT OUTREACH (OUTPATIENT)
Facility: CLINIC | Age: 75
End: 2025-05-09
Payer: MEDICARE

## 2025-06-17 ENCOUNTER — OFFICE VISIT (OUTPATIENT)
Dept: FAMILY MEDICINE | Facility: CLINIC | Age: 75
End: 2025-06-17
Payer: MEDICARE

## 2025-06-17 VITALS
BODY MASS INDEX: 25.01 KG/M2 | HEART RATE: 68 BPM | SYSTOLIC BLOOD PRESSURE: 132 MMHG | DIASTOLIC BLOOD PRESSURE: 66 MMHG | RESPIRATION RATE: 19 BRPM | HEIGHT: 68 IN | WEIGHT: 165 LBS | OXYGEN SATURATION: 97 % | TEMPERATURE: 97 F

## 2025-06-17 DIAGNOSIS — R15.9 INCONTINENCE OF FECES, UNSPECIFIED FECAL INCONTINENCE TYPE: Primary | ICD-10-CM

## 2025-06-17 PROCEDURE — 99213 OFFICE O/P EST LOW 20 MIN: CPT | Mod: ,,, | Performed by: NURSE PRACTITIONER

## 2025-06-17 NOTE — PROGRESS NOTES
Subjective:      Patient ID: Luciano Mariee is a 74 y.o. White male      Chief Complaint: Mild Bowel Incontinence       Past Medical History:   Diagnosis Date    CAD (coronary artery disease)     Charcot Jagruti Tooth muscular atrophy     Drusen of macula of both eyes 05/30/2023    History of foot ulcer     History of MI (myocardial infarction) 2012    Hyperlipidemia     Muscular dystrophy     Optic nerve atrophy, right 05/30/2023    PAD (peripheral artery disease)     Peripheral arterial occlusive disease 04/28/2023    Type 1 diabetes     Unspecified cataract         HPI  History of Present Illness      HPI:  Mr. Mariee reports bowel incontinence for an unspecified duration. He describes involuntary passage of stool, particularly when passing gas, though not consistently. Stool is normal in color, not loose, but not formed as pellets either. He needs to change or clean himself after these episodes, indicating soiling. The incontinence occurs mainly when he is not sitting down, ceasing when seated. He is uncertain if he has fecal incontinence at other times, expressing difficulty in discerning. He denies any back problems. This issue was discussed in a previous visit, where options of providing a stool sample or a referral to a gastroenterologist (Dr. Robins).  GI attempted to contact patient, but he had not been successful.    He denies constipation, diarrhea, and black or bloody stools.           Patient Care Team:  Berny Moore MD as PCP - General (Internal Medicine)  Silvana Beal LPN as Care Coordinator  Blue Padron Jr., MD as Resident (Cardiology)  Riccardo Garcia MD as Consulting Physician (Endocrinology)    Review of Systems   Constitutional:  Negative for chills, fatigue, fever and unexpected weight change.   HENT: Negative.     Eyes: Negative.    Respiratory: Negative.  Negative for shortness of breath.    Cardiovascular: Negative.  Negative for chest pain.   Gastrointestinal: Negative.     Endocrine: Negative.    Genitourinary: Negative.    Musculoskeletal: Negative.    Integumentary:  Negative.   Allergic/Immunologic: Negative.    Neurological: Negative.  Negative for weakness.   Hematological: Negative.    Psychiatric/Behavioral: Negative.     All other systems reviewed and are negative.        Objective:      Vitals:    06/17/25 1334   BP: 132/66   Pulse: 68   Resp: 19   Temp: 97.2 °F (36.2 °C)      Body mass index is 25.09 kg/m².     Physical Exam  Vitals reviewed.   Constitutional:       Appearance: He is not toxic-appearing.   HENT:      Head: Normocephalic.      Mouth/Throat:      Mouth: Mucous membranes are moist.   Eyes:      Extraocular Movements: Extraocular movements intact.      Pupils: Pupils are equal, round, and reactive to light.   Cardiovascular:      Rate and Rhythm: Normal rate and regular rhythm.      Pulses: Normal pulses.      Heart sounds: Normal heart sounds.   Pulmonary:      Effort: Pulmonary effort is normal. No respiratory distress.      Breath sounds: Normal breath sounds.   Abdominal:      General: Bowel sounds are normal. There is no distension.      Palpations: Abdomen is soft.      Tenderness: There is no abdominal tenderness.   Musculoskeletal:         General: No tenderness. Normal range of motion.      Cervical back: Neck supple.   Skin:     General: Skin is warm and dry.   Neurological:      Mental Status: He is alert and oriented to person, place, and time.   Psychiatric:         Mood and Affect: Mood normal.          Current Medications[1]    Assessment & Plan:   Assessment & Plan      FECAL INCONTINENCE:  - Mr. Mariee experiences bowel incontinence when passing gas, requiring cleaning or changing.  - Bowel movements are regular in color and not loose or pellet-like.  - Mr. Mariee declined stool testing but agreed to gastroenterology referral.  - Referred to Dr. Robins for evaluation of bowel incontinence and colonoscopy to assess sphincter function.  - Keep  appt with PCP for follow-up      FOLLOW-UP:  - Mr. Mariee to follow up after appointment with Dr. Robins.  - Keep appt with PCP for follow-up         Problem List Items Addressed This Visit    None        This note was generated with the assistance of ambient listening technology. Verbal consent was obtained by the patient and accompanying visitor(s) for the recording of patient appointment to facilitate this note. I attest to having reviewed and edited the generated note for accuracy, though some syntax or spelling errors may persist. Please contact the author of this note for any clarification.    Total time (20 minutes).This includes face to face time and non-face to face time preparing to see the patient (eg, review of tests), obtaining and/or reviewing separately obtained history, documenting clinical information in the electronic or other health record, independently interpreting results and communicating results to the patient/family/caregiver, or care coordinator.              [1]   Current Outpatient Medications:     aspirin (ECOTRIN) 81 MG EC tablet, Take 81 mg by mouth once daily., Disp: , Rfl:     cilostazoL (PLETAL) 50 MG Tab, Take 1 tablet (50 mg total) by mouth 2 (two) times daily. 30 minutes before or 2 hours after breakfast and dinner, Disp: 180 tablet, Rfl: 3    clopidogreL (PLAVIX) 75 mg tablet, Take 1 tablet (75 mg total) by mouth once daily., Disp: 90 tablet, Rfl: 3    lisinopriL 10 MG tablet, Take 10 mg by mouth once daily., Disp: , Rfl:     NOVOLOG FLEXPEN U-100 INSULIN 100 unit/mL (3 mL) InPn pen, Inject into the skin. 16 units before breakfast 10 units at noon 10 units with dinner, Disp: , Rfl:     rosuvastatin (CRESTOR) 20 MG tablet, Take 1 tablet (20 mg total) by mouth every evening., Disp: 90 tablet, Rfl: 3    TOUJEO SOLOSTAR U-300 INSULIN 300 unit/mL (1.5 mL) InPn pen, Inject 18 Units into the skin every evening., Disp: , Rfl:

## 2025-06-27 ENCOUNTER — OFFICE VISIT (OUTPATIENT)
Dept: FAMILY MEDICINE | Facility: CLINIC | Age: 75
End: 2025-06-27
Payer: MEDICARE

## 2025-06-27 VITALS
SYSTOLIC BLOOD PRESSURE: 130 MMHG | OXYGEN SATURATION: 97 % | BODY MASS INDEX: 21.8 KG/M2 | WEIGHT: 143.88 LBS | HEIGHT: 68 IN | TEMPERATURE: 98 F | RESPIRATION RATE: 16 BRPM | HEART RATE: 69 BPM | DIASTOLIC BLOOD PRESSURE: 64 MMHG

## 2025-06-27 DIAGNOSIS — I70.222 ATHEROSCLEROSIS OF NATIVE ARTERIES OF EXTREMITIES WITH REST PAIN, LEFT LEG: ICD-10-CM

## 2025-06-27 DIAGNOSIS — Z00.00 WELLNESS EXAMINATION: ICD-10-CM

## 2025-06-27 DIAGNOSIS — G60.0 CHARCOT MARIE TOOTH MUSCULAR ATROPHY: ICD-10-CM

## 2025-06-27 DIAGNOSIS — E78.2 MIXED HYPERLIPIDEMIA: ICD-10-CM

## 2025-06-27 DIAGNOSIS — E10.65 TYPE 1 DIABETES MELLITUS WITH HYPERGLYCEMIA: Primary | ICD-10-CM

## 2025-06-27 DIAGNOSIS — Z12.5 SCREENING FOR MALIGNANT NEOPLASM OF PROSTATE: ICD-10-CM

## 2025-06-27 DIAGNOSIS — R15.9 INCONTINENCE OF FECES, UNSPECIFIED FECAL INCONTINENCE TYPE: ICD-10-CM

## 2025-06-27 DIAGNOSIS — E10.42 DIABETIC POLYNEUROPATHY ASSOCIATED WITH TYPE 1 DIABETES MELLITUS: ICD-10-CM

## 2025-06-27 PROBLEM — G71.00 MUSCULAR DYSTROPHY: Status: RESOLVED | Noted: 2023-04-28 | Resolved: 2025-06-27

## 2025-06-27 NOTE — PROGRESS NOTES
Subjective:      Patient ID: Luciano Mariee is a 74 y.o. male.    Chief Complaint: Follow-up (6 month f/u)    HPI    Patient is , has two adult children that he does not have good relationship with. He owns a mobile home on his own land. Patient cannot drive, has a friend/neighbor Robb who helps pick him up/gets groceries/errands etc.    Patient has meals on wheels 5 days per week  Using wheelchair to get around  Having friend  Rx- they were unable to get insulin mailed to house  He is able to administer Rx  He needs help with transportation  Has missed medical appointment because he has no ride          Type 1 DM:  Diagnosed at age 7  Currently taking Toujeo 18 units qhs and Novolog 20 units before breakfast , 10 units at noon; and 10 units dinner.  Las A1c 7.4   Complications including neuropathy, no retinopathy, failed to do urine microalbumin with endocrine previously  recent labs with [facility] Endocrinologist.    DM eye exam due vision is worse, in office today but needs referral which was placed today      Chronic Ulcer of L foot:  Dr. Saldana following; doing well today this has healed     Cardiologist is Dr. Cholo REECE (PTA/stent); CABG/Valve Replacement; Hx MI  Currently on Plavix, Pletal and aspirin 81 mg po daily  Old records requested but not received   -now seeing Dr. Blue FIERRO on Statin.  Tolerating well.       Hx Charcot-Jagruti-Tooth muscular dystrophy.  Not currently seeing any specialist.            Health Maintenance Due   Topic Date Due    Hepatitis C Screening  Never done    Diabetes Urine Screening  Never done    TETANUS VACCINE  Never done    Colorectal Cancer Screening  Never done    LDCT Lung Screen  Never done    Shingles Vaccine (1 of 2) Never done    Abdominal Aortic Aneurysm Screening  Never done    Foot Exam  05/26/2024    RSV Vaccine (Age 60+ and Pregnant patients) (1 - 1-dose 75+ series) Never done    Diabetic Eye Exam  08/31/2024    COVID-19 Vaccine (4 -  "2024-25 season) 09/01/2024    Hemoglobin A1c  01/24/2025    Lipid Panel  03/05/2025     Review of Systems   Constitutional:  Negative for chills and fever.   Respiratory:  Negative for shortness of breath.    Cardiovascular:  Positive for leg swelling. Negative for chest pain.       Objective:     Vitals:    06/27/25 0754   BP: 130/64   BP Location: Left arm   Patient Position: Sitting   Pulse: 69   Resp: 16   Temp: 97.8 °F (36.6 °C)   TempSrc: Oral   SpO2: 97%   Weight: 65.3 kg (143 lb 14.4 oz)   Height: 5' 8" (1.727 m)     Physical Exam  Vitals and nursing note reviewed.   Constitutional:       Comments: Appears state age  Sitting in wheel chair  Both hands are with diffuse OA of multiple joints, contracted     HENT:      Head: Normocephalic and atraumatic.   Cardiovascular:      Rate and Rhythm: Normal rate and regular rhythm.   Pulmonary:      Effort: Pulmonary effort is normal. No respiratory distress.      Breath sounds: Normal breath sounds. No wheezing.   Abdominal:      General: Abdomen is flat. There is no distension.      Palpations: Abdomen is soft.      Tenderness: There is no abdominal tenderness. There is no guarding.   Musculoskeletal:      Cervical back: Neck supple.   Lymphadenopathy:      Cervical: No cervical adenopathy.   Skin:     Comments: Patient with loss of sensation to both feet  Unable to feel a pulse well upon palpation  No eschar, no black/blue discoloration  Skin is hypertrophic and scaling   Neurological:      Mental Status: He is alert.     Protective Sensation (w/ 10 gram monofilament):  Right: Absent  Left: Absent    Visual Inspection:  Dry Skin -  Bilateral    Pedal Pulses:   Right: Absent  Left: Absent    Posterior Tibialis Pulses:   Right:Absent  Left: Absent    Assessment/Plan:     1. Type 1 diabetes mellitus with hyperglycemia  -     Comprehensive Metabolic Panel; Future; Expected date: 06/27/2025  -     Lipid Panel; Future; Expected date: 06/27/2025  -     CBC Auto " Differential; Future; Expected date: 06/27/2025  -     Hemoglobin A1C; Future; Expected date: 06/27/2025  -     Urinalysis; Future; Expected date: 06/27/2025  -     Microalbumin/Creatinine Ratio, Urine; Future; Expected date: 06/27/2025  -     PSA, Screening; Future; Expected date: 06/27/2025  -     Ambulatory referral/consult to Outpatient Case Management  -     Ambulatory referral/consult to Ophthalmology; Future; Expected date: 07/04/2025  Fasting labs ordered  A1c per patient recall was 7.4  Managed by endocrine  Continue insulin per endocrine recs    2. Wellness examination  -     Comprehensive Metabolic Panel; Future; Expected date: 06/27/2025  -     Lipid Panel; Future; Expected date: 06/27/2025  -     CBC Auto Differential; Future; Expected date: 06/27/2025  -     Hemoglobin A1C; Future; Expected date: 06/27/2025  -     Urinalysis; Future; Expected date: 06/27/2025  -     Microalbumin/Creatinine Ratio, Urine; Future; Expected date: 06/27/2025  -     PSA, Screening; Future; Expected date: 06/27/2025    3. Mixed hyperlipidemia  -     Comprehensive Metabolic Panel; Future; Expected date: 06/27/2025  -     Lipid Panel; Future; Expected date: 06/27/2025  -     CBC Auto Differential; Future; Expected date: 06/27/2025  -     Hemoglobin A1C; Future; Expected date: 06/27/2025  -     Urinalysis; Future; Expected date: 06/27/2025  -     Microalbumin/Creatinine Ratio, Urine; Future; Expected date: 06/27/2025  -     PSA, Screening; Future; Expected date: 06/27/2025  Stable check lipid panel continue crestor  4. Screening for malignant neoplasm of prostate  -     Comprehensive Metabolic Panel; Future; Expected date: 06/27/2025  -     Lipid Panel; Future; Expected date: 06/27/2025  -     CBC Auto Differential; Future; Expected date: 06/27/2025  -     Hemoglobin A1C; Future; Expected date: 06/27/2025  -     Urinalysis; Future; Expected date: 06/27/2025  -     Microalbumin/Creatinine Ratio, Urine; Future; Expected date:  06/27/2025  -     PSA, Screening; Future; Expected date: 06/27/2025    5. Incontinence of feces, unspecified fecal incontinence type  -     Ambulatory referral/consult to Gastroenterology; Future; Expected date: 07/04/2025  Discussed in detail with patient  Needs to be seen by GI  There may not be much that they can do  He reports no skin breakdown on the buttocks  6. Charcot Jagruti Tooth muscular atrophy  -     Ambulatory referral/consult to Outpatient Case Management  Patient needs help with transportation to medical appts  Discussed long range planning for patient- does not want to go to NH, says he has no family to take himj in or help him  7. Atherosclerosis of native arteries of extremities with rest pain, left leg  Unstable, worsening  Needs to see cardiology  Continue aspirin, plavix, pletal and statin  8. Diabetic polyneuropathy associated with type 1 diabetes mellitus  Unchanged  Continue insulin per endocrine recs      Visit today included increased complexity associated with the care of the episodic problem type 1 DM, Charot Jagruti Tooth Disease, polyneuropathy, Atherosclerosis of native arteries of extremities addressed and managing the longitudinal care of the patient due to the serious and/or complex managed problem(s) 25 minutes.       Follow up in about 6 months (around 12/27/2025) for Medicare Wellness.    This includes face to face time and non-face to face time preparing to see the patient (eg, review of tests), obtaining and/or reviewing separately obtained history, documenting clinical information in the electronic or other health record, independently interpreting results and communicating results to the patient/family/caregiver, or care coordinator.

## 2025-07-15 LAB
CHOLEST SERPL-MSCNC: 143 MG/DL (ref 0–200)
HBA1C MFR BLD: 6.3 % (ref 4–6)
HDLC SERPL-MCNC: 70 MG/DL (ref 35–70)
LDLC SERPL CALC-MCNC: 59 MG/DL (ref 0–160)
TRIGL SERPL-MCNC: 62 MG/DL (ref 40–160)

## 2025-07-16 ENCOUNTER — DOCUMENTATION ONLY (OUTPATIENT)
Dept: FAMILY MEDICINE | Facility: CLINIC | Age: 75
End: 2025-07-16
Payer: MEDICARE

## 2025-07-23 ENCOUNTER — TELEPHONE (OUTPATIENT)
Dept: FAMILY MEDICINE | Facility: CLINIC | Age: 75
End: 2025-07-23
Payer: MEDICARE

## 2025-07-23 NOTE — TELEPHONE ENCOUNTER
Copied from CRM #[id number]. Topic: General Inquiry - Test Results  >> Jul 23, 2025  4:03 PM Tiesha wrote:  Who Called: annika Zamudio neighbor    Caller is requesting information on test results.    Name of Test (Lab/Mammo/Etc): labs    Date of Test:  ??  Where the test was performed: ??  Ordering Provider:  swati    Preferred Method of Contact: Phone Call  Patient's Preferred Phone Number on File:[phone]   Best Call Back Number, if different:[phone]   Additional Information:  pt neighbor returned call. Requesting dr medeiros to call pt directly with results.